# Patient Record
Sex: MALE | Race: WHITE | URBAN - METROPOLITAN AREA
[De-identification: names, ages, dates, MRNs, and addresses within clinical notes are randomized per-mention and may not be internally consistent; named-entity substitution may affect disease eponyms.]

---

## 2021-12-07 ENCOUNTER — OFFICE VISIT (OUTPATIENT)
Dept: FAMILY MEDICINE CLINIC | Facility: CLINIC | Age: 36
End: 2021-12-07
Payer: COMMERCIAL

## 2021-12-07 VITALS
OXYGEN SATURATION: 100 % | SYSTOLIC BLOOD PRESSURE: 126 MMHG | BODY MASS INDEX: 28.8 KG/M2 | HEIGHT: 68.5 IN | DIASTOLIC BLOOD PRESSURE: 68 MMHG | TEMPERATURE: 98 F | RESPIRATION RATE: 16 BRPM | HEART RATE: 100 BPM | WEIGHT: 192.19 LBS

## 2021-12-07 DIAGNOSIS — Z00.00 ROUTINE HEALTH MAINTENANCE: Primary | ICD-10-CM

## 2021-12-07 DIAGNOSIS — M25.50 ARTHRALGIA, UNSPECIFIED JOINT: ICD-10-CM

## 2021-12-07 DIAGNOSIS — G89.29 NECK PAIN, CHRONIC: ICD-10-CM

## 2021-12-07 DIAGNOSIS — M10.9 GOUT OF MULTIPLE SITES, UNSPECIFIED CAUSE, UNSPECIFIED CHRONICITY: ICD-10-CM

## 2021-12-07 DIAGNOSIS — M54.2 NECK PAIN, CHRONIC: ICD-10-CM

## 2021-12-07 PROCEDURE — 3074F SYST BP LT 130 MM HG: CPT | Performed by: FAMILY MEDICINE

## 2021-12-07 PROCEDURE — 90686 IIV4 VACC NO PRSV 0.5 ML IM: CPT | Performed by: FAMILY MEDICINE

## 2021-12-07 PROCEDURE — 3078F DIAST BP <80 MM HG: CPT | Performed by: FAMILY MEDICINE

## 2021-12-07 PROCEDURE — 3008F BODY MASS INDEX DOCD: CPT | Performed by: FAMILY MEDICINE

## 2021-12-07 PROCEDURE — 90471 IMMUNIZATION ADMIN: CPT | Performed by: FAMILY MEDICINE

## 2021-12-07 PROCEDURE — 99385 PREV VISIT NEW AGE 18-39: CPT | Performed by: FAMILY MEDICINE

## 2021-12-07 RX ORDER — PANTOPRAZOLE SODIUM 40 MG/1
1 TABLET, DELAYED RELEASE ORAL DAILY
COMMUNITY
Start: 2021-12-06

## 2021-12-07 NOTE — PROGRESS NOTES
Patient presents with:  Establish Care: New patient  Physical  Arm Pain: Pt has bilateral antecubial pain. He wakes up with it every morning. Difficulty Breathing: SOB for 1-2months. Dizziness:  This happens when pt bends down to pick something up  Immuni syringe (25082)                          12/07/2021      TDAP                  03/15/2011    Obesity screening: Body mass index is 28.8 kg/m².    Diabetes screening: due  Hypercholesterolemia screening: due  Depression screening:   Depression Screening (PHQ no adenopathy, no carotid bruit, no JVD, supple, symmetrical, trachea midline and thyroid not enlarged, symmetric, no tenderness/mass/nodules  Lungs: clear to auscultation bilaterally  Chest wall: no tenderness  Heart: S1, S2 normal, no murmur, click, rub given cervical issues.   -     VITAMIN D, 25-HYDROXY  -     TABBY, DIRECT, REFLEX TO 9 ENAS  -     SED RATE, WESTERGREN (AUTOMATED)  -     C-REACTIVE PROTEIN    Neck pain, chronic  Unclear etiology however suspect muscle tension is contributing to lot of othe

## 2021-12-16 ENCOUNTER — TELEPHONE (OUTPATIENT)
Dept: FAMILY MEDICINE CLINIC | Facility: CLINIC | Age: 36
End: 2021-12-16

## 2021-12-16 NOTE — TELEPHONE ENCOUNTER
Pt was seen 12/7 was given 2 cards with chiropractors info for him to see. Pt misplaced cards would like to know the names of the providers Dr. Shelly Green had referred him to. Please call with information.

## 2021-12-16 NOTE — TELEPHONE ENCOUNTER
[facility] [id number] and [facility] [phone]    Contact information provided to patient.

## 2022-02-07 ENCOUNTER — TELEPHONE (OUTPATIENT)
Dept: FAMILY MEDICINE CLINIC | Facility: CLINIC | Age: 37
End: 2022-02-07

## 2022-02-07 NOTE — TELEPHONE ENCOUNTER
Patient no showed appointment 02/07/22.  Called patient and left message on machine informing him of no show and $40 no show fee, asked he contact office to reschedule

## 2022-03-03 RX ORDER — PANTOPRAZOLE SODIUM 40 MG/1
40 TABLET, DELAYED RELEASE ORAL DAILY
Qty: 90 TABLET | Refills: 1 | Status: SHIPPED | OUTPATIENT
Start: 2022-03-03

## 2022-03-03 NOTE — TELEPHONE ENCOUNTER
Pt requesting a refill for pantoprazole 40 MG Oral Tab EC be sent to [facility]. Pt only has 8 days left of medication.

## 2023-12-20 ENCOUNTER — LAB ENCOUNTER (OUTPATIENT)
Dept: LAB | Facility: HOSPITAL | Age: 38
End: 2023-12-20
Attending: FAMILY MEDICINE
Payer: COMMERCIAL